# Patient Record
Sex: MALE | Race: WHITE
[De-identification: names, ages, dates, MRNs, and addresses within clinical notes are randomized per-mention and may not be internally consistent; named-entity substitution may affect disease eponyms.]

---

## 2019-09-05 ENCOUNTER — HOSPITAL ENCOUNTER (EMERGENCY)
Dept: HOSPITAL 53 - M ED | Age: 19
Discharge: HOME | End: 2019-09-05
Payer: COMMERCIAL

## 2019-09-05 VITALS — SYSTOLIC BLOOD PRESSURE: 150 MMHG | DIASTOLIC BLOOD PRESSURE: 88 MMHG

## 2019-09-05 VITALS — WEIGHT: 150.31 LBS | BODY MASS INDEX: 22.78 KG/M2 | HEIGHT: 68 IN

## 2019-09-05 DIAGNOSIS — M54.9: ICD-10-CM
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
## 2019-09-05 DIAGNOSIS — F17.210: ICD-10-CM
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
## 2019-09-05 DIAGNOSIS — Z88.5: ICD-10-CM

## 2019-09-05 DIAGNOSIS — S01.111A: Primary | ICD-10-CM

## 2019-10-02 ENCOUNTER — HOSPITAL ENCOUNTER (EMERGENCY)
Dept: HOSPITAL 53 - M ED | Age: 19
Discharge: HOME | End: 2019-10-02
Payer: COMMERCIAL

## 2019-10-02 VITALS — SYSTOLIC BLOOD PRESSURE: 124 MMHG | DIASTOLIC BLOOD PRESSURE: 69 MMHG

## 2019-10-02 VITALS — BODY MASS INDEX: 22.86 KG/M2 | WEIGHT: 154.32 LBS | HEIGHT: 69 IN

## 2019-10-02 DIAGNOSIS — F17.210: ICD-10-CM

## 2019-10-02 DIAGNOSIS — Z88.5: ICD-10-CM

## 2019-10-02 DIAGNOSIS — R11.2: Primary | ICD-10-CM

## 2019-10-02 DIAGNOSIS — K82.9: ICD-10-CM

## 2019-10-02 DIAGNOSIS — R19.7: ICD-10-CM

## 2019-10-02 LAB
ALBUMIN SERPL BCG-MCNC: 4.1 GM/DL (ref 3.2–5.2)
ALT SERPL W P-5'-P-CCNC: 31 U/L (ref 12–78)
BASOPHILS # BLD AUTO: 0 10^3/UL (ref 0–0.2)
BASOPHILS NFR BLD AUTO: 0.6 % (ref 0–1)
BILIRUB CONJ SERPL-MCNC: 0.1 MG/DL (ref 0–0.2)
BILIRUB SERPL-MCNC: 0.4 MG/DL (ref 0.2–1)
BUN SERPL-MCNC: 9 MG/DL (ref 7–18)
CALCIUM SERPL-MCNC: 9.2 MG/DL (ref 8.5–10.1)
CHLORIDE SERPL-SCNC: 106 MEQ/L (ref 98–107)
CO2 SERPL-SCNC: 30 MEQ/L (ref 21–32)
CREAT SERPL-MCNC: 1.1 MG/DL (ref 0.7–1.3)
EOSINOPHIL # BLD AUTO: 0 10^3/UL (ref 0–0.5)
EOSINOPHIL NFR BLD AUTO: 0.6 % (ref 0–3)
FLUAV RNA UPPER RESP QL NAA+PROBE: NEGATIVE
FLUBV RNA UPPER RESP QL NAA+PROBE: NEGATIVE
GLUCOSE SERPL-MCNC: 83 MG/DL (ref 70–100)
HCT VFR BLD AUTO: 46.2 % (ref 42–52)
HGB BLD-MCNC: 15.8 G/DL (ref 13.5–17.5)
LIPASE SERPL-CCNC: 107 U/L (ref 73–393)
LYMPHOCYTES # BLD AUTO: 2.2 10^3/UL (ref 1.5–5)
LYMPHOCYTES NFR BLD AUTO: 35.2 % (ref 24–44)
MCH RBC QN AUTO: 30.9 PG (ref 27–33)
MCHC RBC AUTO-ENTMCNC: 34.2 G/DL (ref 32–36.5)
MCV RBC AUTO: 90.4 FL (ref 80–96)
MONOCYTES # BLD AUTO: 0.6 10^3/UL (ref 0–0.8)
MONOCYTES NFR BLD AUTO: 9.9 % (ref 0–5)
NEUTROPHILS # BLD AUTO: 3.3 10^3/UL (ref 1.5–8.5)
NEUTROPHILS NFR BLD AUTO: 53.4 % (ref 36–66)
PLATELET # BLD AUTO: 189 10^3/UL (ref 150–450)
POTASSIUM SERPL-SCNC: 3.9 MEQ/L (ref 3.5–5.1)
PROT SERPL-MCNC: 7.1 GM/DL (ref 6.4–8.2)
RBC # BLD AUTO: 5.11 10^6/UL (ref 4.3–6.1)
SODIUM SERPL-SCNC: 141 MEQ/L (ref 136–145)
WBC # BLD AUTO: 6.3 10^3/UL (ref 4–10)

## 2019-10-02 PROCEDURE — 96374 THER/PROPH/DIAG INJ IV PUSH: CPT

## 2019-10-02 PROCEDURE — 85025 COMPLETE CBC W/AUTO DIFF WBC: CPT

## 2019-10-02 PROCEDURE — 96361 HYDRATE IV INFUSION ADD-ON: CPT

## 2019-10-02 PROCEDURE — 81001 URINALYSIS AUTO W/SCOPE: CPT

## 2019-10-02 PROCEDURE — 99284 EMERGENCY DEPT VISIT MOD MDM: CPT

## 2019-10-02 PROCEDURE — 83690 ASSAY OF LIPASE: CPT

## 2019-10-02 PROCEDURE — 87502 INFLUENZA DNA AMP PROBE: CPT

## 2019-10-02 PROCEDURE — 80048 BASIC METABOLIC PNL TOTAL CA: CPT

## 2019-10-02 PROCEDURE — 80076 HEPATIC FUNCTION PANEL: CPT

## 2019-10-02 PROCEDURE — 93005 ELECTROCARDIOGRAM TRACING: CPT

## 2019-10-02 NOTE — ECGEPIP
[facility] - ED

                                       

                                       Test Date:    2019-10-02

Pat Name:     JOSEY AUGUSTIN           Department:   

Patient ID:   [id number]                 Room:         -

Gender:       Male                     Technician:   bi

:          2000               Requested By: ANKUR GARIBAY PA-C

Order Number: [id number]     Reading MD:   Jacqueline Ramos

                                 Measurements

Intervals                              Axis          

Rate:         82                       P:            54

NJ:           160                      QRS:          72

QRSD:         88                       T:            53

QT:           347                                    

QTc:          407                                    

                           Interpretive Statements

SINUS RHYTHM

NO PRIOR

Electronically Signed on 10-2-2019 20:42:17 EDT by Jacqueline Ramos

## 2020-01-02 ENCOUNTER — HOSPITAL ENCOUNTER (EMERGENCY)
Dept: HOSPITAL 53 - M ED | Age: 20
Discharge: LEFT BEFORE DISCHARGE | End: 2020-01-02
Payer: COMMERCIAL

## 2020-01-02 VITALS
SYSTOLIC BLOOD PRESSURE: 143 MMHG | BODY MASS INDEX: 25.76 KG/M2 | DIASTOLIC BLOOD PRESSURE: 86 MMHG | WEIGHT: 169.98 LBS | HEIGHT: 68 IN

## 2020-01-02 DIAGNOSIS — Z53.21: Primary | ICD-10-CM

## 2020-03-23 ENCOUNTER — HOSPITAL ENCOUNTER (EMERGENCY)
Dept: HOSPITAL 53 - M ED | Age: 20
Discharge: HOME | End: 2020-03-23
Payer: COMMERCIAL

## 2020-03-23 VITALS — HEIGHT: 68 IN | WEIGHT: 177.69 LBS | BODY MASS INDEX: 26.93 KG/M2

## 2020-03-23 VITALS — DIASTOLIC BLOOD PRESSURE: 86 MMHG | SYSTOLIC BLOOD PRESSURE: 143 MMHG

## 2020-03-23 DIAGNOSIS — Z88.5: ICD-10-CM

## 2020-03-23 DIAGNOSIS — S50.852A: Primary | ICD-10-CM
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
## 2020-03-23 DIAGNOSIS — F17.200: ICD-10-CM
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
## 2020-03-23 NOTE — REP
Clinical:  Foreign body.

 

Technique:  AP and lateral views of the left forearm.

 

Findings:

There is a small 5 mm radiodense foreign body within the superficial subcutaneous

tissues overlying the distal anteromedial forearm consistent with the given

history of metal shaving.

 

The osseous structures are intact without acute fracture or dislocation.  No

subcutaneous emphysema.

 

Impression:

Small 5 mm foreign body consistent with metal shaving as per history.

 

 

Electronically Signed by

Dorian Bragg MD 03/23/2020 11:02 A